# Patient Record
(demographics unavailable — no encounter records)

---

## 2024-10-16 NOTE — BEGINNING OF VISIT
[Patient] : patient [] :  [[employer] ] : provided by [employer]   [Time Spent: ____ minutes] : Total time spent using  services: [unfilled] minutes. The patient's primary language is not English thus required  services. [Interpreters_IDNumber] : [id number] [Interpreters_FullName] : [employer]

## 2024-10-16 NOTE — DISCUSSION/SUMMARY
[FreeTextEntry1] : Head Injury Altered mental status Plan Ice pack given. Pain medication given 911 activated at 12:40. EMS arrived and assessed. Paramedics arrived at 1:10 and he left for [facility] with Mom in attendance

## 2024-10-16 NOTE — PHYSICAL EXAM
[NL] : nonerythematous oropharynx [Lethargic] : lethargic [FreeTextEntry1] : falling asleep; c/o dizziness [FreeTextEntry2] : no visible or palpable injury noted at the back of his head [de-identified] : unable to say what day or date it is; good hand eye coordination;; answering questions intermittently; unable to say his birth date

## 2024-10-16 NOTE — HISTORY OF PRESENT ILLNESS
[de-identified] : head injury [FreeTextEntry6] : 12 year old male tackled on the turf outside and fell backwards and hit his head   Pain level now is 9/10 No nausea, vomiting or LOC He walked to the Medical Room holding his head Feeling dizzy and very sleepy.

## 2024-10-18 NOTE — PHYSICAL EXAM
[Alert] : alert [NL] : nonerythematous oropharynx [FreeTextEntry1] : clear speech; answers questions; appropriately [de-identified] : alert and oriented to person, place , day and date; steady gait ; equal strength good hand eye coordination

## 2024-10-18 NOTE — DISCUSSION/SUMMARY
[FreeTextEntry1] : Headache post concussion 10/16/24  Plan No sports until cleared by follow up Neuro appointment.  Pain medication given. Advised to rest as much as possible this  weekend. Will attempt to call Mom through school personnel to  advise her that he needs to be cleared for sports participation.

## 2024-10-18 NOTE — HISTORY OF PRESENT ILLNESS
[de-identified] : headache [FreeTextEntry6] : 12 year old male here with headache  HPI: He was sent to [facility] 10/16/24 after being tackled during recess at school and hitting his head on the ground He was found to be dizzy and sleepy during exam so 911 was  called. He states he had a Cat Scan there and diagnosed with  Neck strain and concussion.  He states today that he is going back for follow up tomorrow.  Pain level now is 6/10 Started when he woke up this morning States he has no confusion, sleepiness, nausea vomiting

## 2025-02-24 NOTE — HISTORY OF PRESENT ILLNESS
RN reviewed chart and called mother Brianne Hernandez at [phone]  RN following to remind mom that Adeel Tellez has Dr Willy Ralph appointment 21    Mom states that her mom will provide transportation   Mom states that  Her  from [id number] has been helping her a lot   Mom states that she is getting her physical to get a drivers permit   Mom states her  and mom are going to teach her to drive   Mom aware  I am available   Mom aware that RN will be  OOO and returning 21   RN will follow up upon return   RN looked up sibling Ella Perdomo   14  Last well visit 9/3/20 ,allerg follow up , psych follow up           1 well visit 21       2 dental      3 Dr Carolyn Najera follow up last seen on 21      4 mom has mental health through preventive measures        5  C&Y case closed  Services in place  [Sleep Concerns] : no sleep concerns [Has concerns about body or appearance] : does not have concerns about body or appearance [Screen time (except homework) less than 2 hours a day] : no screen time (except homework) less than 2 hours a day [Uses electronic nicotine delivery system] : does not use electronic nicotine delivery system [Exposure to electronic nicotine delivery system] : no exposure to electronic nicotine delivery system [Uses tobacco] : does not use tobacco [Exposure to tobacco] : no exposure to tobacco [Uses drugs] : does not use drugs  [Exposure to drugs] : no exposure to drugs [Drinks alcohol] : does not drink alcohol [Exposure to alcohol] : no exposure to alcohol [Has problems with sleep] : does not have problems with sleep [Gets depressed, anxious, or irritable/has mood swings] : does not get depressed, anxious, or irritable/has mood swings [Has thought about hurting self or considered suicide] : has not thought about hurting self or considered suicide [FreeTextEntry1] : 12 year old male here for well child exam and vaccines  HPI: He is feeling well today with no concerns  PMH: history of high cholesterol , triglycerides and LDL Allergies:: states he is allergic to avocado  NKDA  Home: Lives with Mom, Step Dad 8 year old brother, 3 year old brother and 5 year old sister No smokers at home Ed: 7th grade in [employer] and is a good student Activities: likes to play football Dental ; went about one year ago Diet: Eats fruit, but not much vegetables, meat, eggs and dairy Drinks mainly water but drinks juice 5 times a day; and soda once a day No elimination issues Feels sad sometimes because kids make fun of him and he gets bullied Never sexually active No substance use

## 2025-02-24 NOTE — DISCUSSION/SUMMARY
[de-identified] : BMI >99th % [FreeTextEntry1] : Well   pre adolescent.  - BMI >95th % - Myopia - Positive depression screen Plan -  referral for counseling - Needs vaccines:   VIS and consent form sent home for Flu and HPV # 2  -Counseled regarding , pubertal changes, seatbelt safety, and internet safety. Healthy eating habits, exercise, no sugary drinks and keeping recreational screen time to less than 2 hours a day discussed. - Infection prevention discussed - Bright Futures Parent handout sent home Dental screening performed.  Fluoride varnish applied to all tooth surfaces Written and oral post fluoride varnish instructions given.  Do not brush or floss for 6 hours. If possible wait until tomorrow to resume normal oral hygiene. Eat a soft diet for the next 6 hours, nothing crunchy or sticky Avoid hot drinks for the next 6 hours. Anticipatory guidance; dental hygiene. Written/oral information on  general dental care given              Visit summary sent home

## 2025-04-02 NOTE — PHYSICAL EXAM
[Erythematous Oropharynx] : nonerythematous oropharynx [FreeTextEntry4] : nasal congestion [FreeTextEntry7] : dry cough

## 2025-04-02 NOTE — HISTORY OF PRESENT ILLNESS
[de-identified] : sick [FreeTextEntry6] : 12 year old male with nasal congestion, cough and headache  HPI: started feeling sick 2 days ago Headache now: 6/10 No fever, body aches, no sore throat Had a sore throat yesterday  Mom, brother (2) and sister are all sick with URI symptoms

## 2025-04-02 NOTE — DISCUSSION/SUMMARY
[FreeTextEntry1] : URI Headache  Plan  Counseled re: fever management.  Counseled re: supportive care.  Encouraged rest.  Increase fluids.   Use honey for cough or cough drops. Viral handout given. Return to clinic as needed for new or worsening symptoms.  TC to Mom message left Respiratory panel sent  Infection precautions discussed. Advised to keep mask on until better